# Patient Record
Sex: FEMALE | Race: BLACK OR AFRICAN AMERICAN | NOT HISPANIC OR LATINO | URBAN - METROPOLITAN AREA
[De-identification: names, ages, dates, MRNs, and addresses within clinical notes are randomized per-mention and may not be internally consistent; named-entity substitution may affect disease eponyms.]

---

## 2021-01-25 ENCOUNTER — APPOINTMENT (RX ONLY)
Dept: URBAN - METROPOLITAN AREA OTHER 8 | Facility: OTHER | Age: 31
Setting detail: DERMATOLOGY
End: 2021-01-25

## 2023-05-12 ENCOUNTER — DASHBOARD ENCOUNTERS (OUTPATIENT)
Age: 33
End: 2023-05-12

## 2023-05-12 ENCOUNTER — OFFICE VISIT (OUTPATIENT)
Dept: URBAN - METROPOLITAN AREA CLINIC 92 | Facility: CLINIC | Age: 33
End: 2023-05-12
Payer: SELF-PAY

## 2023-05-12 ENCOUNTER — WEB ENCOUNTER (OUTPATIENT)
Dept: URBAN - METROPOLITAN AREA CLINIC 92 | Facility: CLINIC | Age: 33
End: 2023-05-12

## 2023-05-12 VITALS
DIASTOLIC BLOOD PRESSURE: 68 MMHG | HEIGHT: 64 IN | HEART RATE: 73 BPM | BODY MASS INDEX: 23.56 KG/M2 | SYSTOLIC BLOOD PRESSURE: 116 MMHG | WEIGHT: 138 LBS | TEMPERATURE: 97.3 F

## 2023-05-12 DIAGNOSIS — R10.31 RIGHT LOWER QUADRANT PAIN: ICD-10-CM

## 2023-05-12 DIAGNOSIS — K62.5 RECTAL BLEEDING: ICD-10-CM

## 2023-05-12 DIAGNOSIS — R10.32 LEFT LOWER QUADRANT PAIN: ICD-10-CM

## 2023-05-12 DIAGNOSIS — Z80.0 FAMILY HISTORY OF COLON CANCER: ICD-10-CM

## 2023-05-12 PROCEDURE — 99203 OFFICE O/P NEW LOW 30 MIN: CPT | Performed by: INTERNAL MEDICINE

## 2023-05-12 NOTE — HPI-TODAY'S VISIT:
32-year-old female presents today for rectal bleeding.  She states that rectal bleeding has been present for 1 month and got worse yesterday.  She presented to [facility] ER and was just discharged today.  She states she is having normal bowel movements daily with no constipation or diarrhea but she has been having blood when she wipes and in her stool with every bowel movement. No rectal pain or itching. She had labs done at [facility] last week which were reviewed on phone today.  Her CMP and CBC were normal.  She also has some lower abdominal cramping that does get better with a bowel movement.  She has not had any CT scans.  She has no nausea, vomiting, fever, chills.  No upper GI complaints.  She does have family history of colon cancer in her maternal grandfather unknown what age she was diagnosed.  No family history of any colon polyps.  She does not follow-up with a primary care regularly.

## 2023-05-16 ENCOUNTER — CLAIMS CREATED FROM THE CLAIM WINDOW (OUTPATIENT)
Dept: URBAN - METROPOLITAN AREA CLINIC 4 | Facility: CLINIC | Age: 33
End: 2023-05-16
Payer: SELF-PAY

## 2023-05-16 ENCOUNTER — OFFICE VISIT (OUTPATIENT)
Dept: URBAN - METROPOLITAN AREA SURGERY CENTER 16 | Facility: SURGERY CENTER | Age: 33
End: 2023-05-16
Payer: SELF-PAY

## 2023-05-16 DIAGNOSIS — D12.3 BENIGN NEOPLASM OF TRANSVERSE COLON: ICD-10-CM

## 2023-05-16 DIAGNOSIS — K92.1 ACUTE MELENA: ICD-10-CM

## 2023-05-16 DIAGNOSIS — D12.3 ADENOMA OF TRANSVERSE COLON: ICD-10-CM

## 2023-05-16 PROCEDURE — 45385 COLONOSCOPY W/LESION REMOVAL: CPT | Performed by: INTERNAL MEDICINE

## 2023-05-16 PROCEDURE — 88305 TISSUE EXAM BY PATHOLOGIST: CPT | Performed by: PATHOLOGY

## 2023-05-16 PROCEDURE — G8907 PT DOC NO EVENTS ON DISCHARG: HCPCS | Performed by: INTERNAL MEDICINE

## 2023-06-23 ENCOUNTER — OFFICE VISIT (OUTPATIENT)
Dept: URBAN - METROPOLITAN AREA CLINIC 92 | Facility: CLINIC | Age: 33
End: 2023-06-23

## 2023-06-23 NOTE — HPI-TODAY'S VISIT:
32-year-old female presents today for rectal bleeding.  She states that rectal bleeding has been present for 1 month and got worse yesterday.  She presented to [facility] ER and was just discharged today.  She states she is having normal bowel movements daily with no constipation or diarrhea but she has been having blood when she wipes and in her stool with every bowel movement. No rectal pain or itching. She had labs done at [facility] last week which were reviewed on phone today.  Her CMP and CBC were normal.  She also has some lower abdominal cramping that does get better with a bowel movement.  She has not had any CT scans.  She has no nausea, vomiting, fever, chills.  No upper GI complaints.  She does have family history of colon cancer in her maternal grandfather unknown what age she was diagnosed.  No family history of any colon polyps.  She does not follow-up with a primary care regularly. Colonoscopy 5- demonstrated internal hemorrhoid, 25 mm tubulovillous adenoma with negative margin noted.  Patient was recommended to repeat colonoscopy in 3 years.